# Patient Record
Sex: FEMALE | Race: AMERICAN INDIAN OR ALASKA NATIVE
[De-identification: names, ages, dates, MRNs, and addresses within clinical notes are randomized per-mention and may not be internally consistent; named-entity substitution may affect disease eponyms.]

---

## 2019-02-26 ENCOUNTER — HOSPITAL ENCOUNTER (EMERGENCY)
Dept: HOSPITAL 5 - ED | Age: 24
Discharge: HOME | End: 2019-02-26
Payer: MEDICAID

## 2019-02-26 VITALS — SYSTOLIC BLOOD PRESSURE: 126 MMHG | DIASTOLIC BLOOD PRESSURE: 72 MMHG

## 2019-02-26 DIAGNOSIS — Z91.010: ICD-10-CM

## 2019-02-26 DIAGNOSIS — Z79.899: ICD-10-CM

## 2019-02-26 DIAGNOSIS — G40.909: Primary | ICD-10-CM

## 2019-02-26 LAB
ALBUMIN SERPL-MCNC: 4.6 G/DL (ref 3.9–5)
ALT SERPL-CCNC: 10 UNITS/L (ref 7–56)
BASOPHILS # (AUTO): 0 K/MM3 (ref 0–0.1)
BASOPHILS NFR BLD AUTO: 0.2 % (ref 0–1.8)
BUN SERPL-MCNC: 7 MG/DL (ref 7–17)
BUN/CREAT SERPL: 9 %
CALCIUM SERPL-MCNC: 9.3 MG/DL (ref 8.4–10.2)
EOSINOPHIL # BLD AUTO: 0 K/MM3 (ref 0–0.4)
EOSINOPHIL NFR BLD AUTO: 0.5 % (ref 0–4.3)
HCT VFR BLD CALC: 40.8 % (ref 30.3–42.9)
HEMOLYSIS INDEX: 18
HGB BLD-MCNC: 13.9 GM/DL (ref 10.1–14.3)
LYMPHOCYTES # BLD AUTO: 2.2 K/MM3 (ref 1.2–5.4)
LYMPHOCYTES NFR BLD AUTO: 35.7 % (ref 13.4–35)
MCHC RBC AUTO-ENTMCNC: 34 % (ref 30–34)
MCV RBC AUTO: 92 FL (ref 79–97)
MONOCYTES # (AUTO): 0.6 K/MM3 (ref 0–0.8)
MONOCYTES % (AUTO): 9.7 % (ref 0–7.3)
PLATELET # BLD: 235 K/MM3 (ref 140–440)
RBC # BLD AUTO: 4.42 M/MM3 (ref 3.65–5.03)

## 2019-02-26 PROCEDURE — 36415 COLL VENOUS BLD VENIPUNCTURE: CPT

## 2019-02-26 PROCEDURE — 85025 COMPLETE CBC W/AUTO DIFF WBC: CPT

## 2019-02-26 PROCEDURE — 96374 THER/PROPH/DIAG INJ IV PUSH: CPT

## 2019-02-26 PROCEDURE — 99283 EMERGENCY DEPT VISIT LOW MDM: CPT

## 2019-02-26 PROCEDURE — 80053 COMPREHEN METABOLIC PANEL: CPT

## 2019-02-26 NOTE — EMERGENCY DEPARTMENT REPORT
ED Seizure HPI





- General


Chief Complaint: Seizure


Stated Complaint: SEIZURE/HEAD PAIN/TONGUE


Time Seen by Provider: 02/26/19 13:08


Source: patient


Mode of arrival: Ambulatory


Limitations: No Limitations





- History of Present Illness


Initial Comments: 





Ms. Ponce is a 23-year-old female with history of epilepsy.  After speaking to

mother on the phone, she had a seizure.  Her last seizure was 3 weeks ago.  She 

normally takes Keppra.  However she did not have health insurance for quite some

time.  She has recently regained Medicaid insurance.  She has been under a lot 

of stress.  Her roommate plans to move out.  She is concerned about living alone

considering her diagnosis epilepsy.  She also concerned about financial stress. 

She is employed. She does not drive.  She came to the emergency department with 

the car ride service.


MD Complaint: possible seizure


-: During the night


Witnessed:: No


Trauma: No


Seizure History: known seizure disorder


Place: home


Possible Precipitating Event: none





- Related Data


                                Home Medications











 Medication  Instructions  Recorded  Confirmed  Last Taken


 


lamoTRIgine [LaMICtal Xr] 300 mg PO BID 11/03/13 07/04/14 07/04/14 10:00


 


levETIRAcetam [Keppra TAB] 1,500 mg PO DAILY 11/03/13 07/04/14 07/04/14 10:00


 


levETIRAcetam [Keppra TAB] 2,000 mg PO HS 11/03/13 07/04/14 07/04/14 01:00


 


Folic Acid [Folvite] 1 mg PO QDAY 07/04/14 07/04/14 07/04/14 10:00








                                  Previous Rx's











 Medication  Instructions  Recorded  Last Taken  Type


 


Amoxicillin/K Clav Tab [Augmentin 1 tab PO BID #20 tablet 07/04/14 Unknown Rx





875MG]    


 


Fluticasone Propionate [Flonase] 2 sprays NS QDAY #1 spray 07/04/14 Unknown Rx


 


Hydrocortisone 2.5% [Hytone 2.5% 1 applicatio TP TID #3 tube 07/04/14 Unknown Rx





CREAM]    


 


Ibuprofen [Motrin] 600 mg PO Q8H PRN #30 tablet 07/04/14 Unknown Rx


 


Loratadine [Claritin] 10 mg PO DAILY #30 tablet 07/04/14 Unknown Rx


 


hydrOXYzine HCL [Atarax] 25 mg PO Q6HR PRN #20 tablet 07/04/14 Unknown Rx


 


predniSONE [Deltasone] 20 mg PO QDAY #5 tab 07/04/14 Unknown Rx


 


levETIRAcetam [Keppra] 500 mg PO BID 30 Days #60 tablet 02/26/19 Unknown Rx











                                    Allergies











Allergy/AdvReac Type Severity Reaction Status Date / Time


 


peanut Allergy  Angioedema Verified 07/04/14 14:19














ED Review of Systems


ROS: 


Stated complaint: SEIZURE/HEAD PAIN/TONGUE


Other details as noted in HPI





Comment: All other systems reviewed and negative


Constitutional: denies: fever, malaise


Respiratory: denies: cough





ED Past Medical Hx





- Past Medical History


Previous Medical History?: Yes


Hx Seizures: Yes





- Surgical History


Past Surgical History?: No





- Social History


Smoking Status: Never Smoker


Substance Use Type: None





- Medications


Home Medications: 


                                Home Medications











 Medication  Instructions  Recorded  Confirmed  Last Taken  Type


 


lamoTRIgine [LaMICtal Xr] 300 mg PO BID 11/03/13 07/04/14 07/04/14 10:00 History


 


levETIRAcetam [Keppra TAB] 1,500 mg PO DAILY 11/03/13 07/04/14 07/04/14 10:00 

History


 


levETIRAcetam [Keppra TAB] 2,000 mg PO HS 11/03/13 07/04/14 07/04/14 01:00 

History


 


Amoxicillin/K Clav Tab [Augmentin 1 tab PO BID #20 tablet 07/04/14  Unknown Rx





875MG]     


 


Fluticasone Propionate [Flonase] 2 sprays NS QDAY #1 spray 07/04/14  Unknown Rx


 


Folic Acid [Folvite] 1 mg PO QDAY 07/04/14 07/04/14 07/04/14 10:00 History


 


Hydrocortisone 2.5% [Hytone 2.5% 1 applicatio TP TID #3 tube 07/04/14  Unknown 

Rx





CREAM]     


 


Ibuprofen [Motrin] 600 mg PO Q8H PRN #30 tablet 07/04/14  Unknown Rx


 


Loratadine [Claritin] 10 mg PO DAILY #30 tablet 07/04/14  Unknown Rx


 


hydrOXYzine HCL [Atarax] 25 mg PO Q6HR PRN #20 tablet 07/04/14  Unknown Rx


 


predniSONE [Deltasone] 20 mg PO QDAY #5 tab 07/04/14  Unknown Rx


 


levETIRAcetam [Keppra] 500 mg PO BID 30 Days #60 tablet 02/26/19  Unknown Rx














ED Physical Exam





- General


Limitations: No Limitations


General appearance: alert, in no apparent distress





- Head


Head exam: Present: atraumatic, normocephalic





- Eye


Eye exam: Present: normal appearance





- ENT


ENT exam: Present: mucous membranes moist





- Neck


Neck exam: Present: normal inspection, full ROM.  Absent: tenderness, 

meningismus





- Respiratory


Respiratory exam: Present: normal lung sounds bilaterally.  Absent: respiratory 

distress, wheezes, rales, rhonchi





- Cardiovascular


Cardiovascular Exam: Present: regular rate, normal rhythm, normal heart sounds. 

 Absent: systolic murmur, diastolic murmur, rubs, gallop





- GI/Abdominal


GI/Abdominal exam: Present: soft, normal bowel sounds.  Absent: distended, 

tenderness, guarding, rebound





- Extremities Exam


Extremities exam: Present: normal inspection





- Back Exam


Back exam: Present: normal inspection





- Neurological Exam


Neurological exam: Present: alert, oriented X3





- Psychiatric


Psychiatric exam: Present: normal affect, normal mood





- Skin


Skin exam: Present: warm, dry, intact, normal color.  Absent: rash





ED Course


                                   Vital Signs











  02/26/19





  10:52


 


Temperature 98.7 F


 


Pulse Rate 87


 


Respiratory 18





Rate 


 


Blood Pressure 128/76


 


O2 Sat by Pulse 100





Oximetry 














ED Medical Decision Making





- Lab Data


Result diagrams: 


                                 02/26/19 13:39





                                 02/26/19 13:39





- Medical Decision Making





Anni is a 23-year-old female with history of epilepsy.  She has seizure today

 in the weeks prior.  Without health insurance she was unable to afford 

medication.  She received Keppra load here in the ED.  She was prescribed 

Keppra.  She was referred to outpatient medicine clinic and neurologist.  CBC 

chemistry within normal limits.


Critical care attestation.: 


If time is entered above; I have spent that time in minutes in the direct care o

f this critically ill patient, excluding procedure time.








ED Disposition


Clinical Impression: 


 Epilepsy, Seizure





Disposition: DC-01 TO HOME OR SELFCARE


Is pt being admited?: No


Does the pt Need Aspirin: No


Condition: Stable


Instructions:  Epilepsy (ED), Recurrent Seizures Adult (ED)


Prescriptions: 


levETIRAcetam [Keppra] 500 mg PO BID 30 Days #60 tablet


Referrals: 


[facility] [Other] - 3-5 Days


RUSSELL JEFFERSON MD [Staff Physician] - 3-5 Days


Forms:  Work/School Release Form(ED)